# Patient Record
Sex: MALE | Race: WHITE | NOT HISPANIC OR LATINO
[De-identification: names, ages, dates, MRNs, and addresses within clinical notes are randomized per-mention and may not be internally consistent; named-entity substitution may affect disease eponyms.]

---

## 2021-01-14 DIAGNOSIS — Z23 NEED FOR VACCINATION: ICD-10-CM

## 2022-06-14 ENCOUNTER — OFFICE VISIT (OUTPATIENT)
Dept: MEDICAL GROUP | Facility: CLINIC | Age: 82
End: 2022-06-14
Payer: COMMERCIAL

## 2022-06-14 VITALS
SYSTOLIC BLOOD PRESSURE: 150 MMHG | DIASTOLIC BLOOD PRESSURE: 84 MMHG | WEIGHT: 188 LBS | BODY MASS INDEX: 30.22 KG/M2 | TEMPERATURE: 98.4 F | HEART RATE: 76 BPM | OXYGEN SATURATION: 94 % | HEIGHT: 66 IN

## 2022-06-14 DIAGNOSIS — E78.5 HYPERLIPIDEMIA, UNSPECIFIED HYPERLIPIDEMIA TYPE: ICD-10-CM

## 2022-06-14 DIAGNOSIS — I10 HYPERTENSION, UNSPECIFIED TYPE: ICD-10-CM

## 2022-06-14 DIAGNOSIS — E03.9 HYPOTHYROIDISM, UNSPECIFIED TYPE: ICD-10-CM

## 2022-06-14 PROCEDURE — 99213 OFFICE O/P EST LOW 20 MIN: CPT | Mod: GE | Performed by: STUDENT IN AN ORGANIZED HEALTH CARE EDUCATION/TRAINING PROGRAM

## 2022-06-14 RX ORDER — LEVOTHYROXINE SODIUM 0.03 MG/1
25 TABLET ORAL DAILY
Qty: 30 TABLET | Refills: 1 | Status: SHIPPED | OUTPATIENT
Start: 2022-06-14

## 2022-06-14 RX ORDER — LEVOTHYROXINE SODIUM 0.03 MG/1
25 TABLET ORAL
COMMUNITY
Start: 2022-04-04 | End: 2022-06-14 | Stop reason: SDUPTHER

## 2022-06-14 RX ORDER — SIMVASTATIN 10 MG
10 TABLET ORAL
COMMUNITY
Start: 2022-05-26 | End: 2022-06-14 | Stop reason: SDUPTHER

## 2022-06-14 RX ORDER — SIMVASTATIN 10 MG
10 TABLET ORAL NIGHTLY
Qty: 30 TABLET | Refills: 1 | Status: SHIPPED | OUTPATIENT
Start: 2022-06-14

## 2022-06-14 RX ORDER — LISINOPRIL 20 MG/1
20 TABLET ORAL
COMMUNITY
Start: 2022-04-04 | End: 2022-06-14 | Stop reason: SDUPTHER

## 2022-06-14 RX ORDER — LISINOPRIL 20 MG/1
20 TABLET ORAL DAILY
Qty: 30 TABLET | Refills: 1 | Status: SHIPPED | OUTPATIENT
Start: 2022-06-14 | End: 2022-09-02

## 2022-06-14 ASSESSMENT — PATIENT HEALTH QUESTIONNAIRE - PHQ9: CLINICAL INTERPRETATION OF PHQ2 SCORE: 0

## 2022-06-14 NOTE — PROGRESS NOTES
"Subjective:     CC: Medication refills:    HPI:   Evaristo presents today with:    Problem   Hyperlipidemia    - Patient has history of hyperlipidemia, request refill of his simvastatin     Hypothyroidism    - Patient has a history of hypothyroidism, currently on 25 mcg daily  - Patient denies fatigue, hair loss, rash, changes in weight     Hypertension    - Patient has a history of hypertension and currently takes lisinopril 20 mg daily  - He is requesting refill today  - Patient does not check his blood pressure at home, and states that he will not check his blood pressure at home even if asked to  - Patient denies that he has any headaches, vision changes, chest pain, shortness of breath         Current Outpatient Medications Ordered in Epic   Medication Sig Dispense Refill   • levothyroxine (SYNTHROID) 25 MCG Tab Take 1 Tablet by mouth every day. 30 Tablet 1   • lisinopril (PRINIVIL) 20 MG Tab Take 1 Tablet by mouth every day. 30 Tablet 1   • simvastatin (ZOCOR) 10 MG Tab Take 1 Tablet by mouth every evening. 30 Tablet 1     No current Epic-ordered facility-administered medications on file.       ROS:  Gen: no fevers/chills, no changes in weight  Eyes: no changes in vision  ENT: no changes in hearing  Pulm: no sob, no cough  CV: no chest pain, no palpitations  GI: no nausea/vomiting, no diarrhea  MSk: no myalgias  Skin: no rash  Neuro: no headaches, no numbness/tingling      Objective:     Exam:  BP (!) 150/84 (BP Location: Left arm)   Pulse 76   Temp 36.9 °C (98.4 °F)   Ht 1.676 m (5' 6\")   Wt 85.3 kg (188 lb)   SpO2 94%   BMI 30.34 kg/m²  Body mass index is 30.34 kg/m².    Gen: Alert and oriented, No apparent distress.  Neck: Neck is supple without lymphadenopathy.  Lungs: Normal effort, CTA bilaterally, no wheezes, rhonchi, or rales  CV: Regular rate and rhythm. No murmurs, rubs, or gallops.  Ext: No clubbing, cyanosis, edema.      Assessment & Plan:     81 y.o. male with the following -     Problem List " Items Addressed This Visit     Hyperlipidemia     - Simvastatin refilled  - Checking lipid panel           Relevant Medications    lisinopril (PRINIVIL) 20 MG Tab    simvastatin (ZOCOR) 10 MG Tab    Other Relevant Orders    Comp Metabolic Panel    Lipid Profile    Hypothyroidism     - Synthroid refilled  - Checking TSH with reflex T4           Relevant Medications    levothyroxine (SYNTHROID) 25 MCG Tab    Other Relevant Orders    Comp Metabolic Panel    TSH WITH REFLEX TO FT4    Hypertension     - Checking CMP and TSH, as the patient has a history of hypothyroidism and takes a very low-dose of Synthroid  - Refilling lisinopril  - Counseled patient on need to check blood pressure using home BP cuff; however, patient states he will not do this  - Patient to follow-up in approximately 2 weeks           Relevant Medications    lisinopril (PRINIVIL) 20 MG Tab    simvastatin (ZOCOR) 10 MG Tab            Return in about 2 weeks (around 6/28/2022).    Pb Lopez MD   PGY2

## 2022-06-14 NOTE — ASSESSMENT & PLAN NOTE
- Checking CMP and TSH, as the patient has a history of hypothyroidism and takes a very low-dose of Synthroid  - Refilling lisinopril  - Counseled patient on need to check blood pressure using home BP cuff; however, patient states he will not do this  - Patient to follow-up in approximately 2 weeks

## 2022-09-02 RX ORDER — LISINOPRIL 20 MG/1
TABLET ORAL
Qty: 30 TABLET | Refills: 1 | Status: SHIPPED | OUTPATIENT
Start: 2022-09-02